# Patient Record
Sex: MALE | Race: WHITE | NOT HISPANIC OR LATINO | URBAN - METROPOLITAN AREA
[De-identification: names, ages, dates, MRNs, and addresses within clinical notes are randomized per-mention and may not be internally consistent; named-entity substitution may affect disease eponyms.]

---

## 2019-06-14 ENCOUNTER — EMERGENCY (EMERGENCY)
Facility: HOSPITAL | Age: 44
LOS: 1 days | Discharge: HOME | End: 2019-06-14
Attending: EMERGENCY MEDICINE | Admitting: EMERGENCY MEDICINE
Payer: COMMERCIAL

## 2019-06-14 VITALS
OXYGEN SATURATION: 97 % | RESPIRATION RATE: 18 BRPM | HEART RATE: 93 BPM | SYSTOLIC BLOOD PRESSURE: 127 MMHG | DIASTOLIC BLOOD PRESSURE: 85 MMHG | TEMPERATURE: 98 F

## 2019-06-14 PROCEDURE — 99284 EMERGENCY DEPT VISIT MOD MDM: CPT | Mod: 25

## 2019-06-14 NOTE — ED ADULT NURSE NOTE - CHIEF COMPLAINT QUOTE
Pt complaining of altered mental status. Pt was picked up [facility] on [address] on the [address]. Pt admits to drinking tonight.

## 2019-06-14 NOTE — ED PROVIDER NOTE - UNABLE TO OBTAIN
Patient arrives intoxicated, unable to provide a history or cooperate with physical exam Urgent need for Intervention

## 2019-06-14 NOTE — ED ADULT TRIAGE NOTE - CHIEF COMPLAINT QUOTE
Pt complaining of altered mental status. Pt was picked up [facility] on [address]. Pt admits to drinking tonight Pt complaining of altered mental status. Pt was picked up [facility] on [address] the [address]. Pt admits to drinking tonight.

## 2019-06-18 DIAGNOSIS — F10.129 ALCOHOL ABUSE WITH INTOXICATION, UNSPECIFIED: ICD-10-CM

## 2019-06-18 DIAGNOSIS — R41.82 ALTERED MENTAL STATUS, UNSPECIFIED: ICD-10-CM

## 2020-03-06 NOTE — ED PROVIDER NOTE - PRINCIPAL DIAGNOSIS
EGD:  Multiple clean based gastric and duodenal ulcers    Rec:  Advance diet  protonix 40 mg BID  D/C in AM if stable Acute alcohol intoxication